# Patient Record
Sex: FEMALE | Race: WHITE
[De-identification: names, ages, dates, MRNs, and addresses within clinical notes are randomized per-mention and may not be internally consistent; named-entity substitution may affect disease eponyms.]

---

## 2018-01-28 ENCOUNTER — HOSPITAL ENCOUNTER (EMERGENCY)
Dept: HOSPITAL 17 - NEPD | Age: 38
Discharge: HOME | End: 2018-01-28
Payer: SELF-PAY

## 2018-01-28 VITALS
TEMPERATURE: 99 F | OXYGEN SATURATION: 98 % | SYSTOLIC BLOOD PRESSURE: 184 MMHG | RESPIRATION RATE: 13 BRPM | HEART RATE: 104 BPM | DIASTOLIC BLOOD PRESSURE: 100 MMHG

## 2018-01-28 VITALS — WEIGHT: 242.51 LBS | BODY MASS INDEX: 41.4 KG/M2 | HEIGHT: 64 IN

## 2018-01-28 VITALS — DIASTOLIC BLOOD PRESSURE: 78 MMHG | SYSTOLIC BLOOD PRESSURE: 152 MMHG

## 2018-01-28 DIAGNOSIS — J10.1: Primary | ICD-10-CM

## 2018-01-28 LAB
ALBUMIN SERPL-MCNC: 3.6 GM/DL (ref 3.4–5)
ALP SERPL-CCNC: 76 U/L (ref 45–117)
ALT SERPL-CCNC: 45 U/L (ref 10–53)
AST SERPL-CCNC: 18 U/L (ref 15–37)
BASOPHILS # BLD AUTO: 0 TH/MM3 (ref 0–0.2)
BASOPHILS NFR BLD: 0.7 % (ref 0–2)
BILIRUB SERPL-MCNC: 0.3 MG/DL (ref 0.2–1)
BUN SERPL-MCNC: 10 MG/DL (ref 7–18)
CALCIUM SERPL-MCNC: 8.8 MG/DL (ref 8.5–10.1)
CHLORIDE SERPL-SCNC: 106 MEQ/L (ref 98–107)
COLOR UR: YELLOW
CREAT SERPL-MCNC: 0.63 MG/DL (ref 0.5–1)
EOSINOPHIL # BLD: 0.2 TH/MM3 (ref 0–0.4)
EOSINOPHIL NFR BLD: 3.6 % (ref 0–4)
ERYTHROCYTE [DISTWIDTH] IN BLOOD BY AUTOMATED COUNT: 13.8 % (ref 11.6–17.2)
GFR SERPLBLD BASED ON 1.73 SQ M-ARVRAT: 106 ML/MIN (ref 89–?)
GLUCOSE SERPL-MCNC: 96 MG/DL (ref 74–106)
GLUCOSE UR STRIP-MCNC: (no result) MG/DL
HCO3 BLD-SCNC: 26.2 MEQ/L (ref 21–32)
HCT VFR BLD CALC: 34.5 % (ref 35–46)
HGB BLD-MCNC: 12.2 GM/DL (ref 11.6–15.3)
HGB UR QL STRIP: (no result)
KETONES UR STRIP-MCNC: (no result) MG/DL
LYMPHOCYTES # BLD AUTO: 1.1 TH/MM3 (ref 1–4.8)
LYMPHOCYTES NFR BLD AUTO: 23.6 % (ref 9–44)
MCH RBC QN AUTO: 30 PG (ref 27–34)
MCHC RBC AUTO-ENTMCNC: 35.4 % (ref 32–36)
MCV RBC AUTO: 84.8 FL (ref 80–100)
MONOCYTE #: 0.6 TH/MM3 (ref 0–0.9)
MONOCYTES NFR BLD: 11.9 % (ref 0–8)
MUCOUS THREADS #/AREA URNS LPF: (no result) /LPF
NEUTROPHILS # BLD AUTO: 2.9 TH/MM3 (ref 1.8–7.7)
NEUTROPHILS NFR BLD AUTO: 60.2 % (ref 16–70)
NITRITE UR QL STRIP: (no result)
PLATELET # BLD: 168 TH/MM3 (ref 150–450)
PMV BLD AUTO: 8.9 FL (ref 7–11)
PROT SERPL-MCNC: 7.5 GM/DL (ref 6.4–8.2)
RBC # BLD AUTO: 4.07 MIL/MM3 (ref 4–5.3)
SODIUM SERPL-SCNC: 140 MEQ/L (ref 136–145)
SP GR UR STRIP: 1.02 (ref 1–1.03)
SQUAMOUS #/AREA URNS HPF: 2 /HPF (ref 0–5)
URINE LEUKOCYTE ESTERASE: (no result)
WBC # BLD AUTO: 4.8 TH/MM3 (ref 4–11)

## 2018-01-28 PROCEDURE — 96360 HYDRATION IV INFUSION INIT: CPT

## 2018-01-28 PROCEDURE — 87804 INFLUENZA ASSAY W/OPTIC: CPT

## 2018-01-28 PROCEDURE — 84702 CHORIONIC GONADOTROPIN TEST: CPT

## 2018-01-28 PROCEDURE — 85025 COMPLETE CBC W/AUTO DIFF WBC: CPT

## 2018-01-28 PROCEDURE — 99284 EMERGENCY DEPT VISIT MOD MDM: CPT

## 2018-01-28 PROCEDURE — 80053 COMPREHEN METABOLIC PANEL: CPT

## 2018-01-28 PROCEDURE — 81001 URINALYSIS AUTO W/SCOPE: CPT

## 2018-01-28 PROCEDURE — 71045 X-RAY EXAM CHEST 1 VIEW: CPT

## 2018-01-28 PROCEDURE — 87081 CULTURE SCREEN ONLY: CPT

## 2018-01-28 PROCEDURE — 87880 STREP A ASSAY W/OPTIC: CPT

## 2018-01-28 NOTE — RADRPT
EXAM DATE/TIME:  01/28/2018 17:33 

 

[facility] COMPARISON:     

No previous studies available for comparison.

 

                     

INDICATIONS :     

Cough.

                     

 

MEDICAL HISTORY :     

None.          

 

SURGICAL HISTORY :     

None.   

 

ENCOUNTER:     

Initial                                        

 

ACUITY:     

1 day      

 

PAIN SCORE:     

5/10

 

LOCATION:     

Bilateral  chest

 

FINDINGS:     

A single view of the chest demonstrates the lungs to be symmetrically aerated without evidence of mas
s, infiltrate or effusion.  The cardiomediastinal contours are unremarkable.  Osseous structures are 
intact.

 

CONCLUSION:     

No evidence of acute cardiopulmonary disease.

 

 

 

 Tong Renae MD on January 28, 2018 at 17:44           

Board Certified Radiologist.

 This report was verified electronically.

## 2018-01-28 NOTE — PD
HPI


Chief Complaint:  Cold / Flu Symptoms


Time Seen by Provider:  17:20


Travel History


International Travel<30 days:  No


Contact w/Intl Traveler<30days:  No


Traveled to known affect area:  No





History of Present Illness


HPI


37-year-old female here for evaluation of flulike symptoms for last 3 days.  

The patient has had a cough productive of yellowish sputum, generalized malaise

, body aches.  She denies nausea, vomiting, or diarrhea.  No abdominal pain.  

She has had a fever.  She was seen by her primary care physician last week for 

sore throat and was given medicine which did not help her.





[facility]


Past Medical History


Medical History:  Denies Significant Hx


Pregnant?:  Not Pregnant


LMP:  12/26/17





Past Surgical History


Surgical History:  No Previous Surgery





Social History


Alcohol Use:  No


Tobacco Use:  No


Substance Use:  No





Allergies-Medications


(Allergen,Severity, Reaction):  


Coded Allergies:  


     penicillin G (Unverified  Allergy, Unknown, 1/28/18)


Reported Meds & Prescriptions





Reported Meds & Active Scripts


Active


Cleocin (Clindamycin HCl) 150 Mg Cap 300 Mg PO QID 10 Days








Review of Systems


Except as stated in HPI:  all other systems reviewed are Neg





Physical Exam


Narrative


GENERAL: Well-developed, well-nourished, no apparent distress.


SKIN: Focused skin assessment warm/dry.  No rash.


HEAD: Atraumatic. Normocephalic. 


EYES: Pupils equal and round. No scleral icterus. No injection or drainage. 


ENT: Mucous membranes pink and moist.


NECK: Trachea midline. No JVD.  No nuchal rigidity.


CARDIOVASCULAR: Regular rate and rhythm.  


RESPIRATORY: No accessory muscle use. Clear to auscultation. Breath sounds 

equal bilaterally. 


GASTROINTESTINAL: Abdomen soft, non-tender, nondistended. 


MUSCULOSKELETAL: No obvious deformities. No clubbing.  No cyanosis.  No edema. 


NEUROLOGICAL: Awake and alert. No obvious cranial nerve deficits.  Motor 

grossly within normal limits. Normal speech.


PSYCHIATRIC: Appropriate mood and affect; insight and judgment normal.





Data


Data


Last Documented VS





Vital Signs








  Date Time  Temp Pulse Resp B/P (MAP) Pulse Ox O2 Delivery O2 Flow Rate FiO2


 


1/28/18 15:27 99.0 104 13 184/100 (128) 98   








Orders





 Orders


Beta Hcg (Quant/Titer) (1/28/18 17:25)


Complete Blood Count With Diff (1/28/18 17:25)


Comprehensive Metabolic Panel (1/28/18 17:25)


Urinalysis - C+S If Indicated (1/28/18 17:25)


Iv Access Insert/Monitor (1/28/18 17:25)


Ecg Monitoring (1/28/18 17:25)


Oximetry (1/28/18 17:25)


Sodium Chlor 0.9% 1000 Ml Inj (Ns 1000 M (1/28/18 17:25)


Sodium Chloride 0.9% Flush (Ns Flush) (1/28/18 17:30)


Influenzae A/B Antigen (1/28/18 17:25)


Group A Rapid Strep Screen (1/28/18 17:25)


Chest, Single Ap (1/28/18 )


Acetaminophen (Tylenol) (1/28/18 17:30)


Strep Culture (Group A) (1/28/18 17:44)


Oseltamivir (Tamiflu) (1/28/18 18:45)





Labs





Laboratory Tests








Test


  1/28/18


17:44 1/28/18


18:13


 


White Blood Count 4.8 TH/MM3  


 


Red Blood Count 4.07 MIL/MM3  


 


Hemoglobin 12.2 GM/DL  


 


Hematocrit 34.5 %  


 


Mean Corpuscular Volume 84.8 FL  


 


Mean Corpuscular Hemoglobin 30.0 PG  


 


Mean Corpuscular Hemoglobin


Concent 35.4 % 


  


 


 


Red Cell Distribution Width 13.8 %  


 


Platelet Count 168 TH/MM3  


 


Mean Platelet Volume 8.9 FL  


 


Neutrophils (%) (Auto) 60.2 %  


 


Lymphocytes (%) (Auto) 23.6 %  


 


Monocytes (%) (Auto) 11.9 %  


 


Eosinophils (%) (Auto) 3.6 %  


 


Basophils (%) (Auto) 0.7 %  


 


Neutrophils # (Auto) 2.9 TH/MM3  


 


Lymphocytes # (Auto) 1.1 TH/MM3  


 


Monocytes # (Auto) 0.6 TH/MM3  


 


Eosinophils # (Auto) 0.2 TH/MM3  


 


Basophils # (Auto) 0.0 TH/MM3  


 


CBC Comment DIFF FINAL  


 


Differential Comment   


 


Blood Urea Nitrogen 10 MG/DL  


 


Creatinine 0.63 MG/DL  


 


Random Glucose 96 MG/DL  


 


Total Protein 7.5 GM/DL  


 


Albumin 3.6 GM/DL  


 


Calcium Level 8.8 MG/DL  


 


Alkaline Phosphatase 76 U/L  


 


Aspartate Amino Transf


(AST/SGOT) 18 U/L 


  


 


 


Alanine Aminotransferase


(ALT/SGPT) 45 U/L 


  


 


 


Total Bilirubin 0.3 MG/DL  


 


Sodium Level 140 MEQ/L  


 


Potassium Level 3.4 MEQ/L  


 


Chloride Level 106 MEQ/L  


 


Carbon Dioxide Level 26.2 MEQ/L  


 


Anion Gap 8 MEQ/L  


 


Estimat Glomerular Filtration


Rate 106 ML/MIN 


  


 


 


Human Chorionic Gonadotropin,


Quant LESS THAN 1


MIU/ML 


 


 


Urine Color  YELLOW 


 


Urine Turbidity  CLEAR 


 


Urine pH  5.5 


 


Urine Specific Gravity  1.016 


 


Urine Protein  NEG mg/dL 


 


Urine Glucose (UA)  NEG mg/dL 


 


Urine Ketones  NEG mg/dL 


 


Urine Occult Blood  NEG 


 


Urine Nitrite  NEG 


 


Urine Bilirubin  NEG 


 


Urine Urobilinogen


  


  LESS THAN 2.0


MG/DL


 


Urine Leukocyte Esterase  NEG 


 


Urine RBC  2 /hpf 


 


Urine WBC  1 /hpf 


 


Urine Squamous Epithelial


Cells 


  2 /hpf 


 


 


Urine Mucus  FEW /lpf 


 


Microscopic Urinalysis Comment


  


  CULT NOT


INDICATED











MDM


Medical Decision Making


Medical Screen Exam Complete:  Yes


Emergency Medical Condition:  Yes


Differential Diagnosis


Influenza, URI, viral illness, pneumonia, dehydration, metabolic abnormality


Narrative Course


Vital signs reviewed.





CBC is unremarkable.


CMP is unremarkable.


Beta hCG is negative.





UA is not suggestive of UTI.





Influenza B-positive.





Chest x-ray:


No evidence of acute cardio pulmonary disease.





The patient was made aware of all findings could she was given a liter of 

normal saline here in the emergency department and a first dose of Tamiflu.  I 

believe she is stable for discharge home with outpatient follow-up with her 

primary care physician this week.  She will be started on Tamiflu and was 

advised to stay hydrated with plenty of fluids and to keep fever under control 

by alternating between Tylenol and ibuprofen.  She was advised on when to 

return to the emergency department.  She verbalizes understanding and agreement 

with plan.





Diagnosis





 Primary Impression:  


 Influenza B


Referrals:  


Primary Care Physician


2 days





***Additional Instructions:  


Follow-up with your primary care physician this week.


Stay hydrated with plenty of fluids.


Keep fever under control with Tylenol and ibuprofen.


Return to the emergency department for worsening symptoms or any other concerns.


Scripts


Oseltamivir (Tamiflu) 75 Mg Cap


75 MG PO BID for Mgmt Viral Infection for 5 Days, #10 CAP 0 Refills


   Prov: Mango Sahu MD         1/28/18


Disposition:  01 DISCHARGE HOME


Condition:  Stable











Mango Sahu MD Jan 28, 2018 17:30